# Patient Record
Sex: FEMALE | Race: WHITE
[De-identification: names, ages, dates, MRNs, and addresses within clinical notes are randomized per-mention and may not be internally consistent; named-entity substitution may affect disease eponyms.]

---

## 2017-09-28 NOTE — PDOC
*Physical Exam





- Vital Signs


 Last Vital Signs











Temp Pulse Resp BP Pulse Ox


 


 97.5 F L  56 L  14   140/76   97 


 


 09/28/17 01:29  09/28/17 01:29  09/28/17 01:29  09/28/17 01:29  09/28/17 01:29














ED Treatment Course





- LABORATORY


CBC & Chemistry Diagram: 


 09/28/17 01:53





 09/28/17 01:53





- ADDITIONAL ORDERS


Additional order review: 


 Laboratory  Results











  09/28/17 09/28/17





  01:53 01:53


 


Sodium   142


 


Potassium   3.5


 


Chloride   107


 


Carbon Dioxide   26


 


Anion Gap   9


 


BUN   27 H


 


Creatinine   0.7


 


Creat Clearance w eGFR   > 60


 


Random Glucose   111 H


 


Calcium   8.9


 


Total Bilirubin   0.3


 


AST   19


 


ALT   30


 


Alkaline Phosphatase   59


 


Creatine Kinase  132 


 


Troponin I  < 0.02 


 


Total Protein   6.5


 


Albumin   3.8


 


Total Amylase   31


 


Lipase   96








 











  09/28/17





  01:53


 


RBC  4.08


 


MCV  92.6


 


MCHC  34.1


 


RDW  12.6


 


MPV  8.3


 


Neutrophils %  77.6


 


Lymphocytes %  12.9


 


Monocytes %  7.0


 


Eosinophils %  2.1


 


Basophils %  0.4














- Medications


Given in the ED: 


ED Medications














Discontinued Medications














Generic Name Dose Route Start Last Admin





  Trade Name Freq  PRN Reason Stop Dose Admin


 


Sodium Chloride  1,000 mls @ 1,000 mls/hr 09/28/17 01:36 09/28/17 02:24





  Normal Saline -  IV 09/28/17 02:35  1,000 mls/hr





  ASDIR STA   Administration


 


Morphine Sulfate  4 mg 09/28/17 01:36 09/28/17 02:24





  Morphine Injection -  IVPUSH 09/28/17 01:37  4 mg





  ONCE ONE   Administration


 


Ondansetron HCl  8 mg 09/28/17 01:36 09/28/17 02:24





  Zofran Injection  IVPB 09/28/17 01:37  8 mg





  ONCE ONE   Administration














Medical Decision Making





- Medical Decision Making





09/28/17 09:01


called by radiology - pt with a 6mm ureteral stone at Prox L ureter with mild 

hydro


called the patient. currently no pain. no hematuria. Pt currently visiting here 

from [place] for a couple more weeks. Recommended follow up with [address] [place] [address] and follow up with urology - Dr. Doe. Pt verbalises understanding of 

all instructions. Gave all reasons to return to the ED. Pt denies dysuria, f/c, 

hematuria, urinary complaints. No pain. 





*DC/Admit/Observation/Transfer


Diagnosis at time of Disposition: 


 Gastroenteritis





- Discharge Dispostion


Disposition: HOME


Condition at time of disposition: Improved





- Prescriptions


Prescriptions: 


Famotidine [Pepcid] 40 mg PO DAILY #14 tablet


Ondansetron [Zofran Odt -] 4 mg SL Q6H PRN #20 od.tablet


 PRN Reason: Nausea





- Referrals





- Patient Instructions


Printed Discharge Instructions:  DI for Bacterial Gastroenteritis -- Adult


Additional Instructions: 


Follow up with Dr. Stevens (Gastroenterology). Take medications as prescribed. 

Return if symptoms worsen or any concerns for further evaluation.


Print Language: ENGLISH





- Post Discharge Activity

## 2017-09-28 NOTE — PDOC
History of Present Illness





- General


Chief Complaint: Pain


Stated Complaint: PAIN


Time Seen by Provider: 09/28/17 22:42


History Source: Patient, Significant Other


Exam Limitations: No Limitations





- History of Present Illness


Travel History: No


Initial Comments: 


09/28/17 23:18





73yo Female patient seen in this ED yesterday. Please see my previous note. 

Patient was discharge with Dx: Gastroenteritis r/t negative Ct-Scan. Patient 

was then called earlier today and notified that she  actually has a left 6 mm 

Ureter Stone with mild hydronephrosis. Please refer to Elif Reyes charting.





Patient returns tonight c/o left flank pain and vomiting worsening. No new 

complaints at this time.














Past History





- Travel


Traveled outside of the country in the last 30 days: No


Close contact w/someone who was outside of country & ill: No





- Past Medical History


Allergies/Adverse Reactions: 


 Allergies











Allergy/AdvReac Type Severity Reaction Status Date / Time


 


Penicillins Allergy   Verified 09/28/17 01:29











Home Medications: 


Ambulatory Orders





Bupropion HBr [Aplenzin] 174 mg PO TID 09/28/17 


Famotidine [Pepcid] 40 mg PO DAILY #14 tablet 09/28/17 


Levothyroxine [Synthroid -] 0 mcg PO DAILY 09/28/17 


Ondansetron [Zofran Odt -] 4 mg SL Q6H PRN #20 od.tablet 09/28/17 


Tiotropium Bromide [Spiriva] 1 inh IH DAILY 09/28/17 


Ciprofloxacin [Cipro (Restricted To Id)] 500 mg PO BID #14 tablet 09/29/17 


Oxycodone HCl/Acetaminophen [Percocet 5-325 mg Tablet] 1 tab PO Q4H PRN #24 

tablet MDD 6 tabs 09/29/17 


Tamsulosin HCl [Flomax] 0.4 mg PO DAILY #30 cap.er.24h 09/29/17 








COPD: Yes


Thyroid Disease: Yes


Other medical history: depression





- Surgical History


Appendectomy: Yes





- Suicide/Smoking/Psychosocial Hx


Smoking History: Former smoker


Have you smoked in the past 12 months: No


Information on smoking cessation initiated: No


Hx Alcohol Use: Yes


Drug/Substance Use Hx: No





Abd/GI Specific PMHX





- Complaint Specific PMHX


Colitis: No


Diverticulitis: No


Gall Bladder Disease: No


GERD: No


Hepatitis: No


Irritable Bowel Synd (IBS): No


Pancreatitis: No


GI Ulcer Disease: No





**Review of Systems





- Review of Systems


Able to Perform ROS?: Yes


Is the patient limited English proficient: No


Constitutional: No: Chills, Fever


Respiratory: No: Cough, Shortness of Breath, Wheezing


Cardiac (ROS): No: Chest Pain, Palpitations, Syncope, Chest Tightness


ABD/GI: Yes: Nausea, Poor Fluid Intake, Vomiting.  No: Constipated, Diarrhea, 

Poor Appetite, Abdominal cramping


: Yes: Flank Pain (Left).  No: Burning, Dysuria, Discharge, Hematuria


All Other Systems: Reviewed and Negative





*Physical Exam





- Vital Signs


 Last Vital Signs











Temp Pulse Resp BP Pulse Ox


 


 97.6 F   57 L  19   138/87   96 


 


 09/28/17 21:15  09/28/17 21:15  09/28/17 21:15  09/28/17 21:15  09/28/17 21:15














- Physical Exam


General Appearance: Yes: Nourished, Appropriately Dressed, Apparent Distress, 

Mild Distress.  No: Moderate Distress, Severe Distress


Neck: positive: Trachea midline, Supple.  negative: Stridor, Lymphadenopathy (R)

, Lymphadenopathy (L)


Respiratory/Chest: positive: Lungs Clear, Normal Breath Sounds.  negative: 

Chest Tender, Respiratory Distress, Accessory Muscle Use, Labored Respiration, 

Rapid RR, Paradoxal Breathing, Stridor


Cardiovascular: positive: Regular Rhythm, Regular Rate


Gastrointestinal/Abdominal: positive: Tender (Left flank), Soft, Increased 

Bowel Sounds, Tenderness.  negative: Distended, Guarding, Rebound


Musculoskeletal: positive: Normal Inspection, CVA Tenderness, CVA Tenderness (L)

.  negative: CVA Tenderness (R)


Extremity: positive: Normal Capillary Refill, Normal Inspection, Normal Range 

of Motion


Integumentary: positive: Normal Color, Dry, Warm


Neurologic: positive: CNs II-XII NML intact, Fully Oriented, Alert, Normal Mood/

Affect, Normal Response, Motor Strength 5/5





ED Treatment Course





- LABORATORY


CBC & Chemistry Diagram: 


 09/28/17 23:04





 09/28/17 23:04





- Medications


Given in the ED: 


ED Medications














Discontinued Medications














Generic Name Dose Route Start Last Admin





  Trade Name Freq  PRN Reason Stop Dose Admin


 


Pantoprazole Sodium 40 mg/  100 mls @ 200 mls/hr 09/28/17 22:43 09/28/17 23:05





  Sodium Chloride  IVPB 09/28/17 23:12  200 mls/hr





  ONCE ONE   Administration


 


Famotidine/Sodium Chloride  50 mls @ 100 mls/hr 09/28/17 22:44 09/28/17 23:05





  Pepcid 20 Mg Premixed Ivpb -  IVPB 09/28/17 23:13  100 mls/hr





  ONCE ONE   Administration


 


Metoclopramide HCl  10 mg 09/28/17 22:44 09/28/17 23:04





  Reglan Injection -  IVPB 09/28/17 22:45  10 mg





  ONCE ONE   Administration


 


Morphine Sulfate  4 mg 09/28/17 22:44 09/28/17 23:05





  Morphine Injection -  IVPUSH 09/28/17 22:45  4 mg





  ONCE ONE   Administration














Medical Decision Making





- Medical Decision Making





09/28/17 23:24





Plan: Hydrate w/ fluids, give Reglan, Toradol, Morphine, draw labs then 

reassess.





*DC/Admit/Observation/Transfer


Diagnosis at time of Disposition: 


 Renal colic on left side





- Discharge Dispostion


Disposition: HOME


Condition at time of disposition: Improved


Admit: No





- Prescriptions


Prescriptions: 


Ciprofloxacin [Cipro (Restricted To Id)] 500 mg PO BID #14 tablet


Tamsulosin HCl [Flomax] 0.4 mg PO DAILY #30 cap.er.24h


Oxycodone HCl/Acetaminophen [Percocet 5-325 mg Tablet] 1 tab PO Q4H PRN #24 

tablet MDD 6 tabs


 PRN Reason: Severe Pain





- Referrals


Referrals: 


Mendez Doe MD., MD [Staff Physician] - 





- Patient Instructions


Printed Discharge Instructions:  Kidney Stones -- Adult


Additional Instructions: 


Follow up with Dr. Doe (Urology). Call to schedule appointment to discuss 

options in dealing with Renal Stone. Take your medications as prescribed. Do 

not drive, drink alcohol or operate heavy machinery while taking Percocet. 

Return if fever, not stop vomiting, unable to tolerate foods or fluids.





Zofran- Nausea





Percocet- Pain





Flomax- help pass uretheral stone





Dr. Doe- Urologist to call tomorrow for follow up appointment.





Cipro- Antibiotics


Print Language: ENGLISH

## 2017-09-28 NOTE — PDOC
*Physical Exam





- Vital Signs


 Last Vital Signs











Temp Pulse Resp BP Pulse Ox


 


 97.5 F L  56 L  14   140/76   97 


 


 09/28/17 01:29  09/28/17 01:29  09/28/17 01:29  09/28/17 01:29  09/28/17 01:29














ED Treatment Course





- LABORATORY


CBC & Chemistry Diagram: 


 09/28/17 01:53





 09/28/17 01:53





Medical Decision Making





- Medical Decision Making





09/28/17 01:40


agree with care from MANINDER Cardenas





*DC/Admit/Observation/Transfer


Diagnosis at time of Disposition: 


 Gastroenteritis





- Discharge Dispostion


Disposition: HOME


Condition at time of disposition: Improved





- Prescriptions


Prescriptions: 


Famotidine [Pepcid] 40 mg PO DAILY #14 tablet


Ondansetron [Zofran Odt -] 4 mg SL Q6H PRN #20 od.tablet


 PRN Reason: Nausea





- Patient Instructions


Printed Discharge Instructions:  DI for Bacterial Gastroenteritis -- Adult


Additional Instructions: 


Follow up with Dr. Stevens (Gastroenterology). Take medications as prescribed. 

Return if symptoms worsen or any concerns for further evaluation.


Print Language: ENGLISH

## 2019-01-12 ENCOUNTER — HOSPITAL ENCOUNTER (EMERGENCY)
Dept: HOSPITAL 74 - JERFT | Age: 74
Discharge: HOME | End: 2019-01-12
Payer: COMMERCIAL

## 2019-01-12 VITALS — DIASTOLIC BLOOD PRESSURE: 71 MMHG | HEART RATE: 99 BPM | SYSTOLIC BLOOD PRESSURE: 119 MMHG | TEMPERATURE: 98 F

## 2019-01-12 VITALS — BODY MASS INDEX: 17.4 KG/M2

## 2019-01-12 DIAGNOSIS — J44.9: Primary | ICD-10-CM

## 2019-01-12 PROCEDURE — 3E0F7GC INTRODUCTION OF OTHER THERAPEUTIC SUBSTANCE INTO RESPIRATORY TRACT, VIA NATURAL OR ARTIFICIAL OPENING: ICD-10-PCS

## 2019-01-12 NOTE — PDOC
History of Present Illness





- General


Chief Complaint: Cold Symptoms


Stated Complaint: COLD SYMPTOMS


Time Seen by Provider: 01/12/19 10:14


History Source: Patient


Exam Limitations: No Limitations





- History of Present Illness


Initial Comments: 





01/12/19 10:37


Patient came to emergency department with worsening postnasal drip, nasal 

drainage, and now fevers with cough started yesterday. Postnasal drainage 

started last week. Patient suffers from COPD but has progressively worsened 

over the past 2 days to febrile coughing illness.


Timing/Duration: reports: getting worse


Severity: reports: moderate


Modifying Factors: improves with: coughing


Associated Symptoms: reports: cough, dizziness, fever/chills, nasal congestion, 

nasal drainage, wheezing





Past History





- Travel


Traveled outside of the country in the last 30 days: No


Close contact w/someone who was outside of country & ill: No





- Past Medical History


Allergies/Adverse Reactions: 


 Allergies











Allergy/AdvReac Type Severity Reaction Status Date / Time


 


Penicillins Allergy   Verified 01/12/19 10:00











Home Medications: 


Ambulatory Orders





Tiotropium Bromide [Spiriva] 1 inh IH DAILY 09/28/17 


Albuterol Sulfate Inhaler - [Ventolin HFA Inhaler -] 1 - 2 inh PO Q4H #1 

inhaler 01/12/19 


Sulfamethoxazole/Trimethoprim [Bactrim *Ds*] 1 each PO BID #20 tablet 01/12/19 








COPD: Yes


Thyroid Disease: Yes





- Surgical History


Appendectomy: Yes





- Immunization History


Immunization Up to Date: Yes





- Suicide/Smoking/Psychosocial Hx


Smoking History: Never smoked


Have you smoked in the past 12 months: No


Hx Alcohol Use: No


Drug/Substance Use Hx: No





**Review of Systems





- Review of Systems


Able to Perform ROS?: Yes


Is the patient limited English proficient: Yes


Constitutional: Yes: Symptoms Reported, See HPI, Chills, Fever, Loss of Appetite

, Malaise


HEENTM: Yes: Symptoms Reported, See HPI, Nose Congestion (with thick green-

yellow drainage)


Respiratory: Yes: Symptoms reported, See HPI, Cough, Orthopnea, Wheezing


Cardiac (ROS): No: Symptoms Reported


ABD/GI: No: Symptoms Reported


: No: Symptoms Reported


Musculoskeletal: Yes: Symptoms Reported, See HPI, Joint Pain, Joint Swelling


All Other Systems: Reviewed and Negative





*Physical Exam





- Vital Signs


 Last Vital Signs











Temp Pulse Resp BP Pulse Ox


 


 98.0 F   99 H  22 H  119/71   94 L


 


 01/12/19 10:00  01/12/19 10:00  01/12/19 10:00  01/12/19 10:00  01/12/19 10:00














- Physical Exam


General Appearance: Yes: Nourished, Appropriately Dressed, Apparent Distress


HEENT: positive: CARITO, Normal ENT Inspection, TMs Normal (S2 but landmarks 

visualized), Nasal Congestion, Rhinorrhea


Neck: positive: Tender, Supple, Lymphadenopathy (R), Lymphadenopathy (L)


Respiratory/Chest: positive: Rhonchi, Wheezing.  negative: Lungs Clear, Normal 

Breath Sounds


Gastrointestinal/Abdominal: positive: Normal Bowel Sounds, Tender, Soft


Extremity: positive: Normal Inspection


Integumentary: positive: Dry, Warm, Pale


Neurologic: positive: CNs II-XII NML intact, Fully Oriented, Alert, Normal Mood/

Affect, Normal Response





Moderate Sedation





- Procedure Monitoring


Vital Signs: 


Procedure Monitoring Vital Signs











Temperature  98.0 F   01/12/19 10:00


 


Pulse Rate  99 H  01/12/19 10:00


 


Respiratory Rate  22 H  01/12/19 10:00


 


Blood Pressure  119/71   01/12/19 10:00


 


O2 Sat by Pulse Oximetry (%)  94 L  01/12/19 10:00











ED Treatment Course





- RADIOLOGY


Radiology Studies Ordered: 














 Category Date Time Status


 


 CHEST PA & LAT [RAD] Stat Radiology  01/12/19 10:36 Ordered














Progress Note





- Progress Note


Progress Note: 





Somewhat improved after second DuoNeb, chest x-ray is negative for new 

infiltrates. Pulse ox remains in the low 90s to 94 but may exhibit baseline for 

patient with COPD. Patient lives with sister who states feels comfortable going 

home. Will start Bactrim as antibiotic to treat sinusitis for 10 days, will 

continue albuterol inhalers at home, and follow-up with Dr. Ayers on Monday. 

Understands if condition worsens to return to emergency department





*DC/Admit/Observation/Transfer


Diagnosis at time of Disposition: 


Sinusitis


Qualifiers:


 Sinusitis location: unspecified location Chronicity: acute Recurrence: not 

specified as recurrent Qualified Code(s): J01.90 - Acute sinusitis, unspecified








- Discharge Dispostion


Disposition: HOME


Condition at time of disposition: Stable


Decision to Admit order: No





- Prescriptions


Prescriptions: 


Albuterol Sulfate Inhaler - [Ventolin HFA Inhaler -] 1 - 2 inh PO Q4H #1 inhaler


Sulfamethoxazole/Trimethoprim [Bactrim *Ds*] 1 each PO BID #20 tablet





- Referrals


Referrals: 


Parminder Ayers MD [Primary Care Provider] - 





- Patient Instructions


Printed Discharge Instructions:  DI for Acute Bronchitis


Additional Instructions: 


Rest, drink lots of fluids: Teas, water, soups, Pedialyte


Saltwater gargles


Steamy showers/seem to face break up mucus


Avoid contact with others until fevers and cough resolved


Lots of handwashing and good hygiene





Continue over-the-counter medications for symptomatic relief


Tylenol or Motrin for fever and pain


Continue albuterol pump, 2 puffs every 4-6 hours for the next 2 days then as 

needed for continued cough


Bactrim antibiotic 1 tablet every 12 hours for 10 days to treat sinusitis





Followup with private physician in one to 2 days 


Return to emergency department / pediatric hospital for worsened symptoms, 

fevers, dehydration





- Post Discharge Activity

## 2020-02-03 ENCOUNTER — HOSPITAL ENCOUNTER (EMERGENCY)
Dept: HOSPITAL 74 - JER | Age: 75
LOS: 1 days | Discharge: HOME | End: 2020-02-04
Payer: COMMERCIAL

## 2020-02-03 VITALS — SYSTOLIC BLOOD PRESSURE: 150 MMHG | HEART RATE: 107 BPM | DIASTOLIC BLOOD PRESSURE: 80 MMHG

## 2020-02-03 VITALS — BODY MASS INDEX: 17.1 KG/M2

## 2020-02-03 DIAGNOSIS — E03.9: ICD-10-CM

## 2020-02-03 DIAGNOSIS — Z98.890: ICD-10-CM

## 2020-02-03 DIAGNOSIS — R65.10: ICD-10-CM

## 2020-02-03 DIAGNOSIS — F32.9: ICD-10-CM

## 2020-02-03 DIAGNOSIS — Z88.0: ICD-10-CM

## 2020-02-03 DIAGNOSIS — J44.9: ICD-10-CM

## 2020-02-03 DIAGNOSIS — E86.0: Primary | ICD-10-CM

## 2020-02-03 LAB
ALBUMIN SERPL-MCNC: 4.1 G/DL (ref 3.4–5)
ALP SERPL-CCNC: 53 U/L (ref 45–117)
ALT SERPL-CCNC: 37 U/L (ref 13–61)
ANION GAP SERPL CALC-SCNC: 8 MMOL/L (ref 8–16)
AST SERPL-CCNC: 29 U/L (ref 15–37)
BASOPHILS # BLD: 0.2 % (ref 0–2)
BILIRUB SERPL-MCNC: 0.4 MG/DL (ref 0.2–1)
BUN SERPL-MCNC: 22.3 MG/DL (ref 7–18)
CALCIUM SERPL-MCNC: 9.3 MG/DL (ref 8.5–10.1)
CHLORIDE SERPL-SCNC: 106 MMOL/L (ref 98–107)
CO2 SERPL-SCNC: 25 MMOL/L (ref 21–32)
CREAT SERPL-MCNC: 0.7 MG/DL (ref 0.55–1.3)
DEPRECATED RDW RBC AUTO: 12.9 % (ref 11.6–15.6)
EOSINOPHIL # BLD: 3.9 % (ref 0–4.5)
GLUCOSE SERPL-MCNC: 110 MG/DL (ref 74–106)
HCT VFR BLD CALC: 40.1 % (ref 32.4–45.2)
HGB BLD-MCNC: 13.8 GM/DL (ref 10.7–15.3)
LYMPHOCYTES # BLD: 8 % (ref 8–40)
MCH RBC QN AUTO: 31.9 PG (ref 25.7–33.7)
MCHC RBC AUTO-ENTMCNC: 34.5 G/DL (ref 32–36)
MCV RBC: 92.5 FL (ref 80–96)
MONOCYTES # BLD AUTO: 12.6 % (ref 3.8–10.2)
NEUTROPHILS # BLD: 75.3 % (ref 42.8–82.8)
PLATELET # BLD AUTO: 153 K/MM3 (ref 134–434)
PMV BLD: 8 FL (ref 7.5–11.1)
POTASSIUM SERPLBLD-SCNC: 3.5 MMOL/L (ref 3.5–5.1)
PROT SERPL-MCNC: 7 G/DL (ref 6.4–8.2)
RBC # BLD AUTO: 4.33 M/MM3 (ref 3.6–5.2)
SODIUM SERPL-SCNC: 140 MMOL/L (ref 136–145)
WBC # BLD AUTO: 3.8 K/MM3 (ref 4–10)

## 2020-02-03 PROCEDURE — 3E033NZ INTRODUCTION OF ANALGESICS, HYPNOTICS, SEDATIVES INTO PERIPHERAL VEIN, PERCUTANEOUS APPROACH: ICD-10-PCS

## 2020-02-03 PROCEDURE — 3E0337Z INTRODUCTION OF ELECTROLYTIC AND WATER BALANCE SUBSTANCE INTO PERIPHERAL VEIN, PERCUTANEOUS APPROACH: ICD-10-PCS

## 2020-02-03 PROCEDURE — 3E0F7GC INTRODUCTION OF OTHER THERAPEUTIC SUBSTANCE INTO RESPIRATORY TRACT, VIA NATURAL OR ARTIFICIAL OPENING: ICD-10-PCS

## 2020-02-03 PROCEDURE — 3E03329 INTRODUCTION OF OTHER ANTI-INFECTIVE INTO PERIPHERAL VEIN, PERCUTANEOUS APPROACH: ICD-10-PCS

## 2020-02-03 NOTE — PDOC
History of Present Illness





- General


Chief Complaint: SIRS, Suspected/Possible


Stated Complaint: FEVER


Time Seen by Provider: 02/03/20 19:53


History Source: Patient


Exam Limitations: No Limitations





- History of Present Illness


Initial Comments: 





02/03/20 23:23


74yF w PMHx hypothydroidism, depression, COPD presenting w fever, poor appetite

, antione frontal headache, and generalized weakness after colonoscopy this morning 

by Dr Lobo. Advised by Dr Lobo 5d ago to avoid eating fruits and vegetables

, pt has also been reducing food intake. Did not take any meds today. Has 

chronic cough attributed to COPD. Today's colonoscopy showed diverticulosis, 

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 Currently denies chest pain, SOB, 

nausea/vomiting, ABD pain, urinary/bowel mvmt changes.








Past History





- Past Medical History


Allergies/Adverse Reactions: 


 Allergies











Allergy/AdvReac Type Severity Reaction Status Date / Time


 


Penicillins Allergy   Verified 02/03/20 19:50











Home Medications: 


Ambulatory Orders





Tiotropium Bromide [Spiriva] 1 inh IH DAILY 09/28/17 


Albuterol Sulfate Inhaler - [Ventolin HFA Inhaler -] 1 - 2 inh PO Q4H #1 

inhaler 01/12/19 


Sulfamethoxazole/Trimethoprim [Bactrim *Ds*] 1 each PO BID #20 tablet 01/12/19 








COPD: Yes


Thyroid Disease: Yes





- Surgical History


Appendectomy: Yes





- Immunization History


Immunization Up to Date: Yes





- Psycho Social/Smoking Cessation Hx


Smoking History: Never smoked


Have you smoked in the past 12 months: No


Hx Alcohol Use: No


Drug/Substance Use Hx: No





**Review of Systems





- Review of Systems


Constitutional: No: Chills, Fever


HEENTM: No: Eye Pain, Nose Pain, Nose Congestion, Throat Pain


Respiratory: Yes: Cough.  No: Shortness of Breath


Cardiac (ROS): No: Chest Pain, Palpitations, Syncope


ABD/GI: Yes: Poor Appetite.  No: Abdominal Distended, Constipated, Diarrhea, 

Nausea, Vomiting


: No: Burning, Dysuria


Musculoskeletal: No: Back Pain, Joint Pain


Integumentary: No: Bruising, Flushing


Neurological: Yes: Headache.  No: Seizure, Tingling


Psychiatric: No: Anxiety, Depression


Endocrine: No: Intolerance to Cold, Intolerance to Heat


Hematologic/Lymphatic: No: Anemia, Blood Clots





*Physical Exam





- Vital Signs


 Last Vital Signs











Temp Pulse Resp BP Pulse Ox


 


 99.9 F H  107 H  18   150/80   92 L


 


 02/03/20 19:50  02/03/20 19:50  02/03/20 19:50  02/03/20 19:50  02/03/20 19:50














- Physical Exam


General Appearance: Yes: Nourished, Appropriately Dressed, Thin.  No: Apparent 

Distress


HEENT: positive: EOMI, CARITO, Normal Voice, Hearing Grossly Normal.  negative: 

Scleral Icterus (R), Scleral Icterus (L), Rhinorrhea


Respiratory/Chest: positive: Decreased Breath Sounds (R lung).  negative: Chest 

Tender, Respiratory Distress, Accessory Muscle Use, Labored Respiration, 

Crackles, Rales, Rhonchi, Stridor, Wheezing


Cardiovascular: positive: Regular Rhythm, S1, S2, Tachycardia.  negative: Edema

, Murmur


Gastrointestinal/Abdominal: positive: Normal Bowel Sounds, Flat, Soft.  negative

: Tender, Organomegaly


Musculoskeletal: negative: CVA Tenderness (R), CVA Tenderness (L)


Extremity: positive: Delayed Capillary Refill


Integumentary: positive: Normal Color, Dry, Warm


Neurologic: positive: CNs II-XII NML intact, Fully Oriented, Alert, Normal Mood/

Affect, Normal Response, Motor Strength 5/5, Responsive.  negative: Sensory 

Deficit, Confused, Disoriented





ED Treatment Course





- LABORATORY


CBC & Chemistry Diagram: 


 02/03/20 20:21





 02/03/20 20:21





- ADDITIONAL ORDERS


Additional order review: 


 Laboratory  Results











  02/03/20





  20:21


 


Sodium  140


 


Potassium  3.5


 


Chloride  106


 


Carbon Dioxide  25


 


Anion Gap  8


 


BUN  22.3 H


 


Creatinine  0.7


 


Est GFR (CKD-EPI)AfAm  99.62


 


Est GFR (CKD-EPI)NonAf  85.95


 


Random Glucose  110 H


 


Calcium  9.3


 


Total Bilirubin  0.4


 


AST  29


 


ALT  37


 


Alkaline Phosphatase  53


 


Total Protein  7.0


 


Albumin  4.1








 











  02/03/20





  20:21


 


RBC  4.33


 


MCV  92.5


 


MCHC  34.5


 


RDW  12.9


 


MPV  8.0


 


Neutrophils %  75.3


 


Lymphocytes %  8.0  D


 


Monocytes %  12.6 H


 


Eosinophils %  3.9  D


 


Basophils %  0.2














- Medications


Given in the ED: 


ED Medications














Discontinued Medications














Generic Name Dose Route Start Last Admin





  Trade Name Freq  PRN Reason Stop Dose Admin


 


Sodium Chloride  1,000 ml  02/03/20 19:56  02/03/20 23:12





  Normal Saline -  IV  02/03/20 19:57  1,000 ml





  ONCE ONE   Administration





     





     





     





     














Medical Decision Making





- Medical Decision Making





02/03/20 23:51


CXR - clear lung fields


EKG - NSR, HR 92, QTc 460, no ST changes


WBC 3.8





---





74yF w PMHx hypothydroidism, depression, COPD presenting w fever, poor appetite

, antione frontal headache, and generalized weakness after colonoscopy this morning 

by Dr Lobo.


Symptoms likely 2/2 dehydration. Low concern for PNA (clear lungs CXR) vs UTI (

clean UA). No clear source of infection w WBC wnl.





Given duonebx1, tylenol, reglan, 30mL/kg NS, vanc, zosyn. Started 2L NC for 

O2sat 91 on RA. O2sat >95 on RA after NS given.





DC home pending flu results, O2sat >95 on RA





Signed out to night team








Discharge





- Discharge Information


Problems reviewed: Yes


Clinical Impression/Diagnosis: 


 Dehydration, Systemic inflammatory response syndrome (SIRS)





Condition: Improved





- Follow up/Referral


Referrals: 


Enrico Gill MD [Primary Care Provider] - 





- Patient Discharge Instructions


Patient Printed Discharge Instructions:  DI for Dehydration -- Adult


Additional Instructions: 


Drink lots of water. Take tylenol if you have pain





Please follow up with your primary care doctor





- Post Discharge Activity

## 2020-02-03 NOTE — PDOC
Rapid Medical Evaluation


Chief Complaint: SIRS, Suspected/Possible


Time Seen by Provider: 02/03/20 19:53


Medical Evaluation: 


 Allergies











Allergy/AdvReac Type Severity Reaction Status Date / Time


 


Penicillins Allergy   Verified 02/03/20 19:50











02/03/20 19:53


This patient had brief in-person evaluation in triage





cc: fever and abdominal discomfort 





HPI:  Patient reports fever and abdominal discomfort after 


       having colonoscopy today.  States feeling weak, sent by 


       pmd for further evaluation 





PE:


NAD


unlabored breathing 


mild tenderness with palpation





Oders:


labs saline lock 


This patient will proceed to emergency department for further evaluation.





**Discharge Disposition





- Diagnosis


 Abdominal pain








- Referrals





- Patient Instructions





- Post Discharge Activity

## 2020-02-03 NOTE — PDOC
Attending Attestation





- Resident


Resident Name: CruzSeb





- ED Attending Attestation


I have performed the following: I have examined & evaluated the patient, The 

case was reviewed & discussed with the resident, I agree w/resident's findings 

& plan, Exceptions are as noted





- HPI


HPI: 





02/04/20 00:05


This 74-year-old female had a colonoscopy today and later returned to her home 

to find that she had a fever and general malaise





- Physicial Exam


PE: 





02/04/20 00:06


74-year-old female who appears to have dry mucous membranes


Head normocephalic atraumatic


Eyes pupils are equal and reactive to light and accommodation


Neck is supple


Lungs are clear to auscultation bilaterally


CVS tachycardia


Some mild tenderness to deep palpation but no rebound no guarding


Extremities no deformities


Neuro alert and oriented x3





- Medical Decision Making





02/05/20 03:06


Patient received IV fluids and felt much better and was discharged home 


impression dehydration weakness following colonoscopy prep

## 2020-02-04 VITALS — TEMPERATURE: 97.8 F

## 2020-02-04 LAB
APPEARANCE UR: CLEAR
BILIRUB UR STRIP.AUTO-MCNC: NEGATIVE MG/DL
COLOR UR: YELLOW
KETONES UR QL STRIP: (no result)
LEUKOCYTE ESTERASE UR QL STRIP.AUTO: NEGATIVE
NITRITE UR QL STRIP: NEGATIVE
PH BLDV: 7.37 [PH] (ref 7.31–7.41)
PH UR: 5.5 [PH] (ref 5–8)
PROT UR QL STRIP: NEGATIVE
PROT UR QL STRIP: NEGATIVE
SP GR UR: 1.02 (ref 1.01–1.03)
UROBILINOGEN UR STRIP-MCNC: 0.2 MG/DL (ref 0.2–1)
VENOUS PC02: 45.5 MMHG (ref 38–52)
VENOUS PO2: < 49 MMHG (ref 28–48)

## 2020-02-04 NOTE — EKG
Test Reason : 

Blood Pressure : ***/*** mmHG

Vent. Rate : 092 BPM     Atrial Rate : 092 BPM

   P-R Int : 148 ms          QRS Dur : 096 ms

    QT Int : 372 ms       P-R-T Axes : 075 066 021 degrees

   QTc Int : 460 ms

 

NORMAL SINUS RHYTHM

NORMAL ECG

NO PREVIOUS ECGS AVAILABLE

Confirmed by Dion Hernandez MD ([id number]) on 2/4/2020 9:24:13 AM

 

Referred By:             Confirmed By:Dion Hernandez MD

## 2020-02-06 ENCOUNTER — HOSPITAL ENCOUNTER (EMERGENCY)
Dept: HOSPITAL 74 - JER | Age: 75
LOS: 1 days | Discharge: HOME | End: 2020-02-07
Payer: COMMERCIAL

## 2020-02-06 VITALS — BODY MASS INDEX: 18.9 KG/M2

## 2020-02-06 VITALS — TEMPERATURE: 98.1 F

## 2020-02-06 DIAGNOSIS — E87.6: ICD-10-CM

## 2020-02-06 DIAGNOSIS — E03.9: ICD-10-CM

## 2020-02-06 DIAGNOSIS — F32.9: ICD-10-CM

## 2020-02-06 DIAGNOSIS — T37.3X5A: ICD-10-CM

## 2020-02-06 DIAGNOSIS — Z88.1: ICD-10-CM
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
## 2020-02-06 DIAGNOSIS — L27.0: Primary | ICD-10-CM

## 2020-02-06 DIAGNOSIS — J44.9: ICD-10-CM

## 2020-02-06 DIAGNOSIS — Z88.0: ICD-10-CM

## 2020-02-06 PROCEDURE — 3E033GC INTRODUCTION OF OTHER THERAPEUTIC SUBSTANCE INTO PERIPHERAL VEIN, PERCUTANEOUS APPROACH: ICD-10-PCS

## 2020-02-06 NOTE — PDOC
History of Present Illness





- General


Chief Complaint: Rash


Stated Complaint: RASH


Time Seen by Provider: 02/06/20 22:29


History Source: Patient


Exam Limitations: No Limitations





- History of Present Illness


Initial Comments: 








Renee Cortez is a 75 yo F w a pmh of hypothydroidism, depression, and COPD who 

has been taking metronidazole for the past 2 weeks for a colonoscopy which she 

had this past Monday presents to the ER after she experienced a rash on her 

lower legs, a metallic taste in her throat, and 4 episodes of watery NB 

diarrhea today. They removed multiple polyps during her colonoscopy but 

otherwise the event was unremarkable. She states she has not taken any hawk 

medications today other than her standard venlafaxine, advair, levothyroxine 

and metro. She is primarily concerned with her rash on her legs. 





Denies associated abdominal pain, nausea, vomiting, fevers, chills, chest pain, 

SOB, difficulty breathing, throat swelling, new drug or die exposures, tic bites





PCP: Enrico Gill


PSH: appendectomy


Social Hx: Former smoker,  


Allergies: Penicillins














Past History





- Past Medical History


Allergies/Adverse Reactions: 


 Allergies











Allergy/AdvReac Type Severity Reaction Status Date / Time


 


Penicillins Allergy   Verified 02/06/20 22:21











Home Medications: 


Ambulatory Orders





Tiotropium Bromide [Spiriva] 1 inh IH DAILY 09/28/17 


Albuterol Sulfate Inhaler - [Ventolin HFA Inhaler -] 1 - 2 inh PO Q4H #1 

inhaler 01/12/19 


Sulfamethoxazole/Trimethoprim [Bactrim *Ds*] 1 each PO BID #20 tablet 01/12/19 


Methylprednisolone [Medrol Dose Yovani] 4 mg PO ASDIR #21 tablet 02/07/20 








COPD: Yes


Thyroid Disease: Yes





- Surgical History


Appendectomy: Yes





- Immunization History


Immunization Up to Date: Yes





- Psycho Social/Smoking Cessation Hx


Smoking History: Never smoked


Have you smoked in the past 12 months: No


Information on smoking cessation initiated: No


Hx Alcohol Use: No


Drug/Substance Use Hx: No





**Review of Systems





- Review of Systems


Able to Perform ROS?: Yes


Comments:: 








CONSTITUTIONAL:


Absent: fever, no chills, no fatigue


EYES:


Absent: visual changes


ENT:


Absent: ear pain, no sore throat


CARDIOVASCULAR:


Absent: chest pain, no palpitations


RESPIRATORY:


Absent: cough, no SOB


GI:


Present: diarrhea


Absent: abdominal pain, no nausea, no vomiting, no constipation


GENITOURINARY:


Absent: dysuria, no frequency, no hematuria


MUSKULOSKELETAL:


Absent: back pain, no arthralgia, no myalgia


SKIN:


Present: rash


NEURO:


Absent: headache














*Physical Exam





- Vital Signs


 Last Vital Signs











Temp Pulse Resp BP Pulse Ox


 


 98.1 F   84   18   114/71   93 L


 


 02/06/20 22:37  02/06/20 22:37  02/06/20 22:37  02/06/20 22:37  02/06/20 22:37














- Physical Exam











GENERAL:


Well-appearing, well-nourished. No apparent distress.


HEENT:


Normocephalic, atraumatic. PERRL, EOM intact.


CARDIOVASCULAR:


Normal S1, S2. Regular rate and rhythm.


PULMONARY:


No evidence of respiratory distress. Lungs clear to auscultation bilaterally. 

No wheezing, rales or rhonchi.


ABDOMEN:


Soft, non-distended, non-tender.


EXTREMITIES:


Normal ROM in all four extremities. No gross deformities.


SKIN:


The lower abdomen and both legs have a diffuse maculo-papular, blanching, 

petechial, non-pruritic rash. Warm, dry. 


NEUROLOGICAL:


No focal neurological deficits.














ED Treatment Course





- LABORATORY


CBC & Chemistry Diagram: 


 02/07/20 00:35





 02/07/20 00:35





Medical Decision Making





- Medical Decision Making





Renee Cortez is a 75 yo F w a pmh of hypothydroidism, depression, and COPD who 

has been taking metronidazole for the past 2 weeks for a colonoscopy which she 

had this past Monday presents to the ER after she experienced a rash on her 

lower legs, a metallic taste in her throat, and 4 episodes of watery NB 

diarrhea today. They removed multiple polyps during her colonoscopy but 

otherwise the event was unremarkable. She states she has not taken any hawk 

medications today other than her standard venlafaxine, advair, levothyroxine 

and metro. She is primarily concerned with her rash on her legs. 





Vital Signs











Temp Pulse Resp BP Pulse Ox


 


 98.1 F   84   18   114/71   93 L


 


 02/06/20 22:37  02/06/20 22:37  02/06/20 22:37  02/06/20 22:37  02/06/20 22:37











DDx IBNLT: Drug rash, dehydration, electrolyte/metabolic disturbance, anemia, 

not likely anaphylaxis





Plan: Labs, IV hydration, anti-histamine, steroids, likely DC





Labs: elevated BUN and mildly low potasium


- Hydrating with 1L ns


- replenishing potassium orally





Re-assessment: Patient feels well in ED and has no complaints. She would like 

to be discharged and agrees to stop taking metronidazole. She states she will 

follow up with her PCP to make sure everything is okay. 





Disposition: Home with PCP fu

















Discharge





- Discharge Information


Problems reviewed: Yes


Clinical Impression/Diagnosis: 


 Drug rash





Condition: Improved


Disposition: HOME





- Admission


No





- Additional Discharge Information


Prescriptions: 


Methylprednisolone [Medrol Dose Yovani] 4 mg PO ASDIR #21 tablet





- Follow up/Referral


Referrals: 


Enrico Gill MD [Primary Care Provider] - 


[facility] [place] [Provider Group]





- Patient Discharge Instructions


Patient Printed Discharge Instructions:  DI for Rash


Additional Instructions: 


You came into the ER with a rash from your metronidazole. Please stop taking 

your metronidazole and your rash will likely go away. 





We are sending a medication over to your pharmacy for you to take over the next 

3 days. 





Schedule a follow up appointment to make sure you are being taken care of and 

geting better








Come back to the ER with any new or worsening concerns. 








Print Language: ENGLISH





- Post Discharge Activity

## 2020-02-07 VITALS — DIASTOLIC BLOOD PRESSURE: 79 MMHG | HEART RATE: 91 BPM | SYSTOLIC BLOOD PRESSURE: 109 MMHG

## 2020-02-07 LAB
ALBUMIN SERPL-MCNC: 4 G/DL (ref 3.4–5)
ALP SERPL-CCNC: 50 U/L (ref 45–117)
ALT SERPL-CCNC: 52 U/L (ref 13–61)
ANION GAP SERPL CALC-SCNC: 9 MMOL/L (ref 8–16)
AST SERPL-CCNC: 42 U/L (ref 15–37)
BASOPHILS # BLD: 0.2 % (ref 0–2)
BILIRUB SERPL-MCNC: 0.3 MG/DL (ref 0.2–1)
BUN SERPL-MCNC: 23.4 MG/DL (ref 7–18)
CALCIUM SERPL-MCNC: 9.6 MG/DL (ref 8.5–10.1)
CHLORIDE SERPL-SCNC: 105 MMOL/L (ref 98–107)
CO2 SERPL-SCNC: 29 MMOL/L (ref 21–32)
CREAT SERPL-MCNC: 0.6 MG/DL (ref 0.55–1.3)
DEPRECATED RDW RBC AUTO: 12.9 % (ref 11.6–15.6)
EOSINOPHIL # BLD: 5.8 % (ref 0–4.5)
GLUCOSE SERPL-MCNC: 99 MG/DL (ref 74–106)
HCT VFR BLD CALC: 38.8 % (ref 32.4–45.2)
HGB BLD-MCNC: 13.5 GM/DL (ref 10.7–15.3)
LIPASE SERPL-CCNC: 120 U/L (ref 73–393)
LYMPHOCYTES # BLD: 13.3 % (ref 8–40)
MAGNESIUM SERPL-MCNC: 1.9 MG/DL (ref 1.8–2.4)
MCH RBC QN AUTO: 31.9 PG (ref 25.7–33.7)
MCHC RBC AUTO-ENTMCNC: 34.8 G/DL (ref 32–36)
MCV RBC: 91.7 FL (ref 80–96)
MONOCYTES # BLD AUTO: 7.9 % (ref 3.8–10.2)
NEUTROPHILS # BLD: 72.8 % (ref 42.8–82.8)
PHOSPHATE SERPL-MCNC: 3 MG/DL (ref 2.5–4.9)
PLATELET # BLD AUTO: 119 K/MM3 (ref 134–434)
PMV BLD: 8.9 FL (ref 7.5–11.1)
POTASSIUM SERPLBLD-SCNC: 3.1 MMOL/L (ref 3.5–5.1)
PROT SERPL-MCNC: 7 G/DL (ref 6.4–8.2)
RBC # BLD AUTO: 4.23 M/MM3 (ref 3.6–5.2)
SODIUM SERPL-SCNC: 143 MMOL/L (ref 136–145)
WBC # BLD AUTO: 3.6 K/MM3 (ref 4–10)

## 2020-02-07 NOTE — PDOC
Documentation entered by Dread Mcmullen SCRIBE, acting as scribe for Elif Reyes DO.








Elif Reyes DO:  This documentation has been prepared by the Benedict kamara Angel, SCRIBE, under my direction and personally reviewed by me in its 

entirety.  I confirm that the documentation accurately reflects all work, 

treatment, procedures, and medical decision making performed by me.  





Attending Attestation





- Resident


Resident Name: Frank Robertson





- ED Attending Attestation


I have performed the following: I have examined & evaluated the patient, The 

case was reviewed & discussed with the resident, I agree w/resident's findings 

& plan, Exceptions are as noted





- HPI


HPI: 





02/06/20 23:36


The patient is a 74 year old female with a significant PMH of hypothyroidism, 

depression, COPD  who presents to the emergency department for BLE rash. Pt 

states she had  a recent colonoscopy done and has been taking metronidazole for 

the past 2 weeks. Pt states today she took her metronidazole felt like she had 

a mood swing, endorsed a metal taste in her throat and 4 episodes of nb watery 

diarrhea. 











The patient denies shortness of breath, headache and dizziness. Denies fever, 

chills, cough, nausea, vomiting, and constipation. Denies dysuria, frequency, 

urgency and hematuria.





Allergies: Penicillins


PCP: Dr. Gill








- Physicial Exam


PE: 





02/06/20 23:36


GENERAL: Awake, alert, and fully oriented, in no acute distress


HEAD: No signs of trauma


EYES: PERRLA, EOMI, sclera anicteric, conjunctiva clear


ENT: Auricles normal inspection, hearing grossly normal, nares patent, 

oropharynx clear without exudates. +dry mucosa


NECK: Normal ROM, supple, no lymphadenopathy, JVD, or masses


LUNGS: Breath sounds equal, clear to auscultation bilaterally.  No wheezes, and 

no crackles


HEART: Regular rate and rhythm, normal S1 and S2, no murmurs, rubs or gallops


ABDOMEN: Soft, nontender, normoactive bowel sounds.  No guarding, no rebound.  

No masses


EXTREMITIES: Normal range of motion, no edema.  No clubbing or cyanosis. No 

cords, erythema, or tenderness


NEUROLOGICAL: Cranial nerves II through XII grossly intact.  Normal speech, 

normal gait


SKIN: +pale. +Maculopapular blanching rash all over lower back, bilateral hands 

and  BLE. Warm, Dry..








- Medical Decision Making





02/06/20 23:58








I, Dr. Elif Reyes, DO, attest that this document has been prepared under 

my direction and personally reviewed by me in its entirety.   I further attest, 

that it accurately reflects all work, treatment, procedures and medical decision

-making performed by me.  





a/p: 75yo female with LE rash she noticed today


-states she started flagyl about a week ago 


-on monday she had a colonoscopy with polyp removal


-today with 4 episodes of loose stool tonight and then noticed the [facility]


-suspect drug rash from flagyl


-will send labs given diarrhea, will medicate for allergy


-will monitor and reassess


-pt appears dehydrated on exam


02/07/20 01:53


potassium 3.1 will replace


cbc pending


pt signed out pending labs and re-eval





**Heart Score/ECG Review





- ECG Intrepretation


Comment:: 





02/06/20 23:59


sinus at 80, nl axis, nl interval, no acute st/t wave findings

## 2020-02-07 NOTE — EKG
Test Reason : 

Blood Pressure : ***/*** mmHG

Vent. Rate : 080 BPM     Atrial Rate : 080 BPM

   P-R Int : 144 ms          QRS Dur : 076 ms

    QT Int : 380 ms       P-R-T Axes : 074 059 060 degrees

   QTc Int : 438 ms

 

NORMAL SINUS RHYTHM

NORMAL ECG

WHEN COMPARED WITH ECG OF 04-FEB-2020 00:21,

T WAVE INVERSION NO LONGER EVIDENT IN INFERIOR LEADS

Confirmed by DEBBIE CLEMENTE MD ([id number]) on 2/7/2020 12:24:15 PM

 

Referred By:             Confirmed By:DEBBIE CLEMENTE MD

## 2021-07-24 ENCOUNTER — HOSPITAL ENCOUNTER (EMERGENCY)
Dept: HOSPITAL 74 - FER | Age: 76
Discharge: HOME | End: 2021-07-24
Payer: COMMERCIAL

## 2021-07-24 VITALS — BODY MASS INDEX: 18.8 KG/M2

## 2021-07-24 VITALS — TEMPERATURE: 97.8 F | SYSTOLIC BLOOD PRESSURE: 105 MMHG | DIASTOLIC BLOOD PRESSURE: 59 MMHG | HEART RATE: 77 BPM

## 2021-07-24 DIAGNOSIS — S63.259A: Primary | ICD-10-CM

## 2021-07-24 PROCEDURE — 3E0234Z INTRODUCTION OF SERUM, TOXOID AND VACCINE INTO MUSCLE, PERCUTANEOUS APPROACH: ICD-10-PCS | Performed by: STUDENT IN AN ORGANIZED HEALTH CARE EDUCATION/TRAINING PROGRAM

## 2021-11-10 ENCOUNTER — HOSPITAL ENCOUNTER (EMERGENCY)
Dept: HOSPITAL 74 - FER | Age: 76
Discharge: HOME | End: 2021-11-10
Payer: COMMERCIAL

## 2021-11-10 VITALS — TEMPERATURE: 99.9 F | SYSTOLIC BLOOD PRESSURE: 125 MMHG | HEART RATE: 95 BPM | DIASTOLIC BLOOD PRESSURE: 83 MMHG

## 2021-11-10 VITALS — BODY MASS INDEX: 19.5 KG/M2

## 2021-11-10 DIAGNOSIS — B34.9: ICD-10-CM

## 2021-11-10 DIAGNOSIS — J44.1: Primary | ICD-10-CM

## 2021-11-10 LAB
ALBUMIN SERPL-MCNC: 4.4 G/DL (ref 3.4–5)
ALP SERPL-CCNC: 58 U/L (ref 45–117)
ALT SERPL-CCNC: 10 U/L (ref 13–61)
ANION GAP SERPL CALC-SCNC: 10 MMOL/L (ref 8–16)
AST SERPL-CCNC: 25 U/L (ref 15–37)
BASOPHILS # BLD: 1.6 % (ref 0–2)
BILIRUB SERPL-MCNC: 0.5 MG/DL (ref 0.2–1)
BNP SERPL-MCNC: 97.2 PG/ML (ref 5–450)
BUN SERPL-MCNC: 20 MG/DL (ref 7–18)
CALCIUM SERPL-MCNC: 9.9 MG/DL (ref 8.5–10)
CAOX CRY URNS QL MICRO: (no result) /HPF
CHLORIDE SERPL-SCNC: 100 MMOL/L (ref 98–107)
CO2 SERPL-SCNC: 27 MMOL/L (ref 21–32)
CREAT SERPL-MCNC: 0.6 MG/DL (ref 0.55–1.3)
DEPRECATED RDW RBC AUTO: 12.3 % (ref 11.6–15.6)
EOSINOPHIL # BLD: 2.5 % (ref 0–4.5)
EPITH CASTS URNS QL MICRO: (no result) /HPF
GLUCOSE SERPL-MCNC: 107 MG/DL (ref 74–106)
HCT VFR BLD CALC: 39.7 % (ref 32.4–45.2)
HGB BLD-MCNC: 13.1 GM/DL (ref 10.7–15.3)
LYMPHOCYTES # BLD: 19.4 % (ref 8–40)
MCH RBC QN AUTO: 30.4 PG (ref 25.7–33.7)
MCHC RBC AUTO-ENTMCNC: 33.1 G/DL (ref 32–36)
MCV RBC: 92 FL (ref 80–96)
MONOCYTES # BLD AUTO: 14.2 % (ref 3.8–10.2)
NEUTROPHILS # BLD: 62.3 % (ref 42.8–82.8)
PLATELET # BLD AUTO: 163 10^3/UL (ref 134–434)
PMV BLD: 7.6 FL (ref 7.5–11.1)
PROT SERPL-MCNC: 7.1 G/DL (ref 6.4–8.2)
RBC # BLD AUTO: 4.31 M/MM3 (ref 3.6–5.2)
SODIUM SERPL-SCNC: 137 MMOL/L (ref 136–145)
WBC # BLD AUTO: 3.2 K/MM3 (ref 4–10.8)

## 2021-11-10 PROCEDURE — U0003 INFECTIOUS AGENT DETECTION BY NUCLEIC ACID (DNA OR RNA); SEVERE ACUTE RESPIRATORY SYNDROME CORONAVIRUS 2 (SARS-COV-2) (CORONAVIRUS DISEASE [COVID-19]), AMPLIFIED PROBE TECHNIQUE, MAKING USE OF HIGH THROUGHPUT TECHNOLOGIES AS DESCRIBED BY CMS-2020-01-R: HCPCS

## 2021-11-10 PROCEDURE — 3E0F7GC INTRODUCTION OF OTHER THERAPEUTIC SUBSTANCE INTO RESPIRATORY TRACT, VIA NATURAL OR ARTIFICIAL OPENING: ICD-10-PCS

## 2021-11-10 PROCEDURE — C9803 HOPD COVID-19 SPEC COLLECT: HCPCS

## 2021-11-10 PROCEDURE — U0005 INFEC AGEN DETEC AMPLI PROBE: HCPCS

## 2022-05-19 ENCOUNTER — HOSPITAL ENCOUNTER (EMERGENCY)
Dept: HOSPITAL 74 - JER | Age: 77
Discharge: HOME | End: 2022-05-19
Payer: COMMERCIAL

## 2022-05-19 VITALS — BODY MASS INDEX: 20.1 KG/M2

## 2022-05-19 VITALS — SYSTOLIC BLOOD PRESSURE: 114 MMHG | HEART RATE: 70 BPM | TEMPERATURE: 97.6 F | DIASTOLIC BLOOD PRESSURE: 75 MMHG

## 2022-05-19 DIAGNOSIS — R09.89: Primary | ICD-10-CM

## 2023-04-30 ENCOUNTER — HOSPITAL ENCOUNTER (EMERGENCY)
Dept: HOSPITAL 74 - JER | Age: 78
Discharge: ANOTHER HOSPITAL | End: 2023-04-30
Payer: COMMERCIAL

## 2023-04-30 VITALS — DIASTOLIC BLOOD PRESSURE: 87 MMHG | HEART RATE: 82 BPM | SYSTOLIC BLOOD PRESSURE: 125 MMHG | TEMPERATURE: 98 F

## 2023-04-30 VITALS — BODY MASS INDEX: 17.6 KG/M2

## 2023-04-30 VITALS — RESPIRATION RATE: 18 BRPM

## 2023-04-30 DIAGNOSIS — T17.208A: Primary | ICD-10-CM

## 2023-04-30 DIAGNOSIS — Z20.822: ICD-10-CM
